# Patient Record
Sex: MALE | Race: WHITE | Employment: UNEMPLOYED | URBAN - METROPOLITAN AREA
[De-identification: names, ages, dates, MRNs, and addresses within clinical notes are randomized per-mention and may not be internally consistent; named-entity substitution may affect disease eponyms.]

---

## 2018-03-13 ASSESSMENT — MIFFLIN-ST. JEOR: SCORE: 1696.57

## 2018-03-14 ENCOUNTER — SURGERY - HEALTHEAST (OUTPATIENT)
Dept: CARDIOLOGY | Facility: CLINIC | Age: 53
End: 2018-03-14

## 2018-03-15 ASSESSMENT — MIFFLIN-ST. JEOR: SCORE: 1691.29

## 2018-03-29 ENCOUNTER — OFFICE VISIT - HEALTHEAST (OUTPATIENT)
Dept: CARDIOLOGY | Facility: CLINIC | Age: 53
End: 2018-03-29

## 2018-03-29 DIAGNOSIS — I25.110 CORONARY ARTERY DISEASE INVOLVING NATIVE CORONARY ARTERY OF NATIVE HEART WITH UNSTABLE ANGINA PECTORIS (H): ICD-10-CM

## 2018-03-29 DIAGNOSIS — Z86.39 HISTORY OF DIABETES MELLITUS: ICD-10-CM

## 2018-03-29 DIAGNOSIS — I10 ESSENTIAL HYPERTENSION: ICD-10-CM

## 2018-03-29 DIAGNOSIS — K74.69 OTHER CIRRHOSIS OF LIVER (H): ICD-10-CM

## 2018-03-29 DIAGNOSIS — E78.5 DYSLIPIDEMIA, GOAL LDL BELOW 70: ICD-10-CM

## 2018-03-29 DIAGNOSIS — I21.4 NSTEMI (NON-ST ELEVATED MYOCARDIAL INFARCTION) (H): ICD-10-CM

## 2018-03-29 ASSESSMENT — MIFFLIN-ST. JEOR: SCORE: 1731.66

## 2018-04-06 ENCOUNTER — AMBULATORY - HEALTHEAST (OUTPATIENT)
Dept: CARDIAC REHAB | Facility: CLINIC | Age: 53
End: 2018-04-06

## 2018-04-06 DIAGNOSIS — Z95.5 S/P DRUG ELUTING CORONARY STENT PLACEMENT: ICD-10-CM

## 2018-04-06 DIAGNOSIS — I21.4 NSTEMI (NON-ST ELEVATED MYOCARDIAL INFARCTION) (H): ICD-10-CM

## 2019-06-03 ENCOUNTER — OFFICE VISIT - HEALTHEAST (OUTPATIENT)
Dept: CARDIOLOGY | Facility: CLINIC | Age: 54
End: 2019-06-03

## 2019-06-03 DIAGNOSIS — I48.91 ATRIAL FIBRILLATION (H): ICD-10-CM

## 2019-06-18 ENCOUNTER — AMBULATORY - HEALTHEAST (OUTPATIENT)
Dept: CARDIOLOGY | Facility: CLINIC | Age: 54
End: 2019-06-18

## 2019-06-18 ENCOUNTER — COMMUNICATION - HEALTHEAST (OUTPATIENT)
Dept: CARDIOLOGY | Facility: CLINIC | Age: 54
End: 2019-06-18

## 2019-06-18 DIAGNOSIS — I21.4 NSTEMI (NON-ST ELEVATED MYOCARDIAL INFARCTION) (H): ICD-10-CM

## 2019-11-06 ENCOUNTER — HEALTH MAINTENANCE LETTER (OUTPATIENT)
Age: 54
End: 2019-11-06

## 2020-02-16 ENCOUNTER — HEALTH MAINTENANCE LETTER (OUTPATIENT)
Age: 55
End: 2020-02-16

## 2020-11-29 ENCOUNTER — HEALTH MAINTENANCE LETTER (OUTPATIENT)
Age: 55
End: 2020-11-29

## 2021-04-10 ENCOUNTER — HEALTH MAINTENANCE LETTER (OUTPATIENT)
Age: 56
End: 2021-04-10

## 2021-05-29 NOTE — PROGRESS NOTES
Refill for Plavix denied. He is one year post procedure and will stay on Aspirin. No VM available. Message sent to Pharm.

## 2021-05-29 NOTE — TELEPHONE ENCOUNTER
Pt updated via [email or web address].  -mela    ----- Message -----  From: Jay Campos NP  Sent: 6/18/2019   8:11 AM  To: Sarai Altman RN  Subject: FW: Question about medications                   Sarai Vasquez,  I got this message from pt of Dr. Crawford that I saw once post PCI back in 3/2018. He has not seen Dr. Crawford post PCI. I would recommend him to f/u with Dr. Crawford. If he doesn't have opening, I can see him. I would defer to Dr. Crawford regarding medication refill.   Thank you        Per CY office visit 3/29/18:  Dual antiplatelet therapy is being used with aspirin 81 mg daily indefinitely and Clopidogrel 75 mg daily for 1 year.

## 2021-05-30 ENCOUNTER — HEALTH MAINTENANCE LETTER (OUTPATIENT)
Age: 56
End: 2021-05-30

## 2021-06-01 VITALS — HEIGHT: 73 IN | BODY MASS INDEX: 24.63 KG/M2 | WEIGHT: 185.8 LBS

## 2021-06-01 VITALS — BODY MASS INDEX: 23.44 KG/M2 | HEIGHT: 73 IN | WEIGHT: 176.9 LBS

## 2021-06-16 NOTE — TELEPHONE ENCOUNTER
Telephone Encounter by Sarai Altman, RN at 6/18/2019 12:15 PM     Author: Sarai Altman RN Service: -- Author Type: Registered Nurse    Filed: 6/18/2019 12:15 PM Encounter Date: 6/18/2019 Status: Signed    : Sarai Altman RN (Registered Nurse)       Kam Benitez MD   to Anthony Marrufo            6/18/19 11:27 AM   Glad you touched base!  I renewed meds - but you should be on a statin or if not, consider PCSK9 inhibitor - this way you can come off clopidogrel in 1 year.  I would suggest follow up with Dr Crawford or myself to discuss those benefits in the couple months.   Dr. Benitez      Last read by Anthony Marrufo at 11:28 AM on 6/18/2019

## 2021-06-17 NOTE — PROGRESS NOTES
Assessment/Plan:     1.  Coronary artery disease: Patient had was admitted with NSTEMI from 3/13-3/15/18. He underwent coronary angiogram on 3/14/18  showed 95% stenosis in 2nd OM, 85% stenosis in dLAD and ulcer/dissection in LM. Patient received MARICHUY X 2 to 2nd OM , MARICHUY X1 to dLAD, and DESX1 to LM. Also noted to have mild disease through out the RCA and 35% stenosis in RCA. Dual antiplatelet therapy is being used with aspirin 81 mg daily indefinitely and Clopidogrel 75 mg daily for 1 year.  We discussed the importance of antiplatelet therapy and talking with his cardiologist prior to stopping these medications for any reason.     His main symptoms prior to recent cardiac event were pain behind epigastric region, N/V, left sided chest pain radiated to left arm. Denies further symptoms and feeling great since stent placement. He is concerned if his Hep C medications have caused him to have heart disease. He has been tolerating the antiplatelets well with no bleeding complications.     Risk factor modification and lifestyle management topics were discussed including managing comorbidities, heart healthy diet and exercise.  Starting cardiac rehab on 4/6/18.    2.  Dyslipidemia: Anthony Marrufo is not started on statin therapy d/t liver cirrhosis from Hep C. He was recommended to f/u with his primary hepatology at [facility]. Highly encouraged to make the appointment soon.His most recent LDL is 129. We discussed a diet low in saturated fat, weight loss, and exercise.      3.  Hypertension: His blood pressure is well controlled- 120/80 and HR 52. On Metoprolol Succinate 25 mg daily. Asymptomatic.    4.  Diabetes:  We discussed the importance of tightly controlled blood sugars to minimize risk of worsening coronary artery disease. Recent A1C looks excellent 5's. Primary care provider to manage diabetes.    Follow up with Dr. Crawford in 8-10 weeks.    Subjective:     Anthony Marrufo is a 52 y.o. years old white male with a  significant PMH of HTN, DM, h/o GI bleed, Thrombocytopenia, Cirrhosis of liver, Hepatitis C, and Esophageal Varices who is seen at [facility] for post coronary intervention follow up. Patient had was admitted with NSTEMI from 3/13-3/15/18. He underwent coronary angiogram on 3/14/18  showed 95% stenosis in 2nd OM, 85% stenosis in dLAD and ulcer/dissection in LM. Patient received MARICHUY X 2 to 2nd OM , MARICHUY X1 to dLAD, and DESX1 to LM. Also noted to have mild disease through out the RCA and 35% stenosis in RCA. Dual antiplatelet therapy is being used with aspirin 81 mg daily indefinitely and Clopidogrel 75 mg daily for 1 year. His most recent ECHO showed an EF of 63 %.     [facility] accompanied by his wife.His main symptoms prior to recent cardiac event were pain behind epigastric region, N/V, left sided chest pain radiated to left arm. He reports feeling great and denies further symptoms  since stent placement. He is concerned if his Hep C medications have caused him to have the heart disease. He tells me that he has already completed his treatment and his Hep C resolved. He has been tolerating the antiplatelets well with no bleeding complications. His BP is in excellent control. He was started on betablocker and HR in 50's but denies any symptoms. He denies fatigue, lightheadedness, shortness of breath, dyspnea on exertion, orthopnea, PND, palpitations, chest pain, abdominal fullness/bloating and lower extremity edema.    Review of Systems:   General: WNL  Eyes: WNL  Ears/Nose/Throat: WNL  Lungs: WNL  Heart: WNL  Stomach: WNL  Bladder: WNL  Muscle/Joints: WNL  Skin: WNL  Nervous System: WNL  Mental Health: WNL     Blood: WNL     Patient Active Problem List   Diagnosis     Other cirrhosis of liver     Upper GI bleeding     Essential hypertension     NSTEMI (non-ST elevated myocardial infarction)     History of hepatitis C     Thrombocytopenia     History of diabetes mellitus      Coronary artery disease involving native coronary artery of native heart with unstable angina pectoris     Dyslipidemia, goal LDL below 70     Past Medical History:   Diagnosis Date     Hepatitis C      Past Surgical History:   Procedure Laterality Date     CV CORONARY ANGIOGRAM N/A 3/14/2018    Procedure: Coronary Angiogram;  Surgeon: Kam Benitez MD;  Location: [facility];  Service:    No family history on file.    Social History     Social History     Marital status:      Spouse name: N/A     Number of children: N/A     Years of education: N/A     Occupational History     Not on file.     Social History Main Topics     Smoking status: Never Smoker     Smokeless tobacco: Never Used     Alcohol use No     Drug use: No     Sexual activity: Not on file     Other Topics Concern     Not on file     Social History Narrative     Current Outpatient Prescriptions   Medication Sig Dispense Refill     aspirin 81 MG EC tablet Take 1 tablet (81 mg total) by mouth daily. 90 tablet 3     clopidogrel (PLAVIX) 75 mg tablet Take 1 tablet (75 mg total) by mouth daily. 90 tablet 3     metoprolol succinate (TOPROL-XL) 25 MG Take 1 tablet (25 mg total) by mouth daily. 90 tablet 3     No current facility-administered medications for this visit.        No Known Allergies    Objective:     Vitals:    03/29/18 0906   BP: 120/80   Pulse: (!) 52   Resp: 12     Body mass index is 24.51 kg/(m^2).    General Appearance:   Alert, cooperative and in no acute distress.   HEENT:  No scleral icterus; the mucous membranes were pink and moist.   Chest: The spine was straight. The chest was symmetric.   Lungs:   Respirations unlabored; the lungs are clear to auscultation.   Cardiovascular:   Regular rhythm. S1 and S2 without murmur, clicks or rubs. Carotid pulses are intact and symmetrical.  No carotid bruits noted.   Abdomen:  Soft, nontender, nondistended, bowel sounds present   Extremities: No cyanosis, clubbing, or edema.    Skin: No xanthelasma.   Neurologic: Mood and affect are appropriate.   Puncture site: Left radial site is soft with soft with  bruising.  Radial pulses and Pedal pulses intact and symmetrical.  CMS intact.         Lab Review   Lab Results   Component Value Date    CREATININE 0.76 03/15/2018    BUN 6 (L) 03/15/2018     03/15/2018    K 3.8 03/15/2018     03/15/2018    CO2 24 03/15/2018     Creatinine (mg/dL)   Date Value   03/15/2018 0.76   03/14/2018 0.78   03/13/2018 0.96       Cardiographics  Coronary Angiogram: 3/14/18  Conclusion        Estimated blood loss was <20 ml.    2nd Mrg lesion 95% stenosed.    A drug eluting stent was successfully placed.    LM lesion 35% stenosed.    A stent was successfully placed.    Dist LAD lesion 85% stenosed.    A drug eluting stent was successfully placed.    The RCA was moderate.      Pt with nonQ wave MI HepC treated with cirrhosis     Angiography:  LM noted ulcer in prox LM  LAD distal 80%long lesion  Circ OM 90% focal lesion  RCA mild dz     PCI:  1. Deployed MARICHUY to OM with no relow post - not certain if emoblization vs dissection, no improvement with nicardipine, placed 2nd stent distal and post dilated upto 3.5mm prox, with adenosine restored michel 3 flow  2. LM: IVUS with dissection vs ulcer in LM - given concern for dissection with risk for thrombosis or extension, deployed 4x8mm MARICHUY post dilated upto 5mm, IVUS confirmed apposition  3. Deployed MARICHUY to distal LAD      Imp/plan  1. Severe CAD s/p PCI with MARICHUY to Circ/distal LAD and LM - asp/plavix - check platelet activity in AM (received integrillin tonight), metoprolol, suggest statin defer to team re dose.  Cardiac rehab, follow up with  and arrange for primary care.     Echocardiogram: 3/13/18  Summary     Left ventricle ejection fraction is normal. The calculated left ventricular ejection fraction is 63% hypokinesis in the inferolateral wall.    Normal right ventricular size and systolic  function.    No significant valvular heart disease    No previous study for comparison     40  minutes were spent with the patient with greater than 50% spent on education and counseling.      Jay Campos [facility]

## 2021-09-19 ENCOUNTER — HEALTH MAINTENANCE LETTER (OUTPATIENT)
Age: 56
End: 2021-09-19

## 2022-01-09 ENCOUNTER — HEALTH MAINTENANCE LETTER (OUTPATIENT)
Age: 57
End: 2022-01-09

## 2022-05-01 ENCOUNTER — HEALTH MAINTENANCE LETTER (OUTPATIENT)
Age: 57
End: 2022-05-01

## 2022-11-21 ENCOUNTER — HEALTH MAINTENANCE LETTER (OUTPATIENT)
Age: 57
End: 2022-11-21

## 2023-04-16 ENCOUNTER — HEALTH MAINTENANCE LETTER (OUTPATIENT)
Age: 58
End: 2023-04-16

## 2023-06-02 ENCOUNTER — HEALTH MAINTENANCE LETTER (OUTPATIENT)
Age: 58
End: 2023-06-02

## 2023-11-26 ENCOUNTER — HEALTH MAINTENANCE LETTER (OUTPATIENT)
Age: 58
End: 2023-11-26